# Patient Record
Sex: FEMALE | Race: WHITE | Employment: FULL TIME | URBAN - METROPOLITAN AREA
[De-identification: names, ages, dates, MRNs, and addresses within clinical notes are randomized per-mention and may not be internally consistent; named-entity substitution may affect disease eponyms.]

---

## 2019-03-14 ENCOUNTER — APPOINTMENT (OUTPATIENT)
Dept: GENERAL RADIOLOGY | Age: 49
End: 2019-03-14
Payer: COMMERCIAL

## 2019-03-14 ENCOUNTER — HOSPITAL ENCOUNTER (EMERGENCY)
Age: 49
Discharge: HOME | End: 2019-03-14
Attending: EMERGENCY MEDICINE
Payer: COMMERCIAL

## 2019-03-14 VITALS
SYSTOLIC BLOOD PRESSURE: 115 MMHG | BODY MASS INDEX: 25.61 KG/M2 | WEIGHT: 150 LBS | DIASTOLIC BLOOD PRESSURE: 83 MMHG | HEART RATE: 70 BPM | HEIGHT: 64 IN | TEMPERATURE: 99.1 F | RESPIRATION RATE: 16 BRPM | OXYGEN SATURATION: 97 %

## 2019-03-14 DIAGNOSIS — R11.0 NAUSEA: ICD-10-CM

## 2019-03-14 DIAGNOSIS — J10.1 INFLUENZA A: Primary | ICD-10-CM

## 2019-03-14 LAB
RAPID INFLUENZA  B AGN: NEGATIVE
RAPID INFLUENZA A AGN: POSITIVE

## 2019-03-14 PROCEDURE — 71046 X-RAY EXAM CHEST 2 VIEWS: CPT

## 2019-03-14 PROCEDURE — 99283 EMERGENCY DEPT VISIT LOW MDM: CPT

## 2019-03-14 PROCEDURE — 87804 INFLUENZA ASSAY W/OPTIC: CPT

## 2019-03-14 PROCEDURE — 6370000000 HC RX 637 (ALT 250 FOR IP): Performed by: EMERGENCY MEDICINE

## 2019-03-14 RX ORDER — FLUOXETINE HYDROCHLORIDE 40 MG/1
40 CAPSULE ORAL DAILY
COMMUNITY
End: 2020-03-04

## 2019-03-14 RX ORDER — OSELTAMIVIR PHOSPHATE 75 MG/1
75 CAPSULE ORAL 2 TIMES DAILY
Qty: 10 CAPSULE | Refills: 0 | Status: SHIPPED | OUTPATIENT
Start: 2019-03-14 | End: 2019-03-19

## 2019-03-14 RX ORDER — ONDANSETRON 4 MG/1
4 TABLET, ORALLY DISINTEGRATING ORAL ONCE
Status: DISCONTINUED | OUTPATIENT
Start: 2019-03-14 | End: 2019-03-14

## 2019-03-14 RX ORDER — ONDANSETRON 2 MG/ML
4 INJECTION INTRAMUSCULAR; INTRAVENOUS ONCE
Status: DISCONTINUED | OUTPATIENT
Start: 2019-03-14 | End: 2019-03-14

## 2019-03-14 RX ORDER — ONDANSETRON 4 MG/1
4 TABLET, ORALLY DISINTEGRATING ORAL ONCE
Status: COMPLETED | OUTPATIENT
Start: 2019-03-14 | End: 2019-03-14

## 2019-03-14 RX ORDER — OSELTAMIVIR PHOSPHATE 75 MG/1
75 CAPSULE ORAL ONCE
Status: COMPLETED | OUTPATIENT
Start: 2019-03-14 | End: 2019-03-14

## 2019-03-14 RX ORDER — ONDANSETRON 4 MG/1
4 TABLET, FILM COATED ORAL EVERY 8 HOURS PRN
Qty: 10 TABLET | Refills: 0 | Status: SHIPPED | OUTPATIENT
Start: 2019-03-14 | End: 2020-03-04

## 2019-03-14 RX ADMIN — ONDANSETRON 4 MG: 4 TABLET, ORALLY DISINTEGRATING ORAL at 05:47

## 2019-03-14 RX ADMIN — OSELTAMIVIR PHOSPHATE 75 MG: 75 CAPSULE ORAL at 05:20

## 2020-01-08 ENCOUNTER — OFFICE VISIT (OUTPATIENT)
Dept: ORTHOPEDIC SURGERY | Age: 50
End: 2020-01-08
Payer: COMMERCIAL

## 2020-01-08 VITALS — BODY MASS INDEX: 26.46 KG/M2 | HEIGHT: 64 IN | WEIGHT: 155 LBS

## 2020-01-08 PROCEDURE — G8419 CALC BMI OUT NRM PARAM NOF/U: HCPCS | Performed by: ORTHOPAEDIC SURGERY

## 2020-01-08 PROCEDURE — 99203 OFFICE O/P NEW LOW 30 MIN: CPT | Performed by: ORTHOPAEDIC SURGERY

## 2020-01-08 PROCEDURE — G8484 FLU IMMUNIZE NO ADMIN: HCPCS | Performed by: ORTHOPAEDIC SURGERY

## 2020-01-08 PROCEDURE — G8427 DOCREV CUR MEDS BY ELIG CLIN: HCPCS | Performed by: ORTHOPAEDIC SURGERY

## 2020-01-08 PROCEDURE — L3908 WHO COCK-UP NONMOLDE PRE OTS: HCPCS | Performed by: ORTHOPAEDIC SURGERY

## 2020-01-08 PROCEDURE — 1036F TOBACCO NON-USER: CPT | Performed by: ORTHOPAEDIC SURGERY

## 2020-01-08 NOTE — PROGRESS NOTES
Assessment: Classic symptoms of bilateral carpal tunnel syndrome but she also has ulnar-sided right wrist pain which is been progressive and is consistent with an underlying TFCC injury chronic    Treatment Plan: She has tried using a wrist wraps and these really have not helped her symptoms. I think she is symptomatic enough that she is probably going to require bilateral carpal tunnel releases but we will check EMGs and also do an MRI of her right wrist to see if we have concomitant TFCC pathology and then follow-up 1 more time in the office before considering scheduling surgery. 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 Chief Complaint:  Hand Pain (2 month history of bilateral hand pain and wrist pain, waking up at night, numbness and tingling. She is also having right ulnar sided wrist pain)      History of Present Illness  Radha Ludwig is a 52 y.o. female. Location: bilateral hands Severity: severe pain, particularly at night Duration: 2 onths Modifying factors: rest, splint  Associated symptoms: numbness and tingling    Contributory History  none    Medical History    Current Outpatient Medications on File Prior to Visit   Medication Sig Dispense Refill    Amphetamine-Dextroamphetamine (AMPHETAMINE SALT COMBO PO) Take by mouth      FLUoxetine (PROZAC) 40 MG capsule Take 40 mg by mouth daily      ondansetron (ZOFRAN) 4 MG tablet Take 1 tablet by mouth every 8 hours as needed for Nausea (Patient not taking: Reported on 1/8/2020) 10 tablet 0     No current facility-administered medications on file prior to visit.       Past Medical History:   Diagnosis Date    Influenza A 03/14/2019     No Known Allergies  Social History     Socioeconomic History    Marital status: Single     Spouse name: Not on file    Number of children: Not on file    Years of education: Not on file    Highest education level: Not on file   Occupational History    Not on file   Social Needs    Financial resource strain: Not on file    Food insecurity:     Worry: Not on file     Inability: Not on file    Transportation needs:     Medical: Not on file     Non-medical: Not on file   Tobacco Use    Smoking status: Never Smoker    Smokeless tobacco: Never Used   Substance and Sexual Activity    Alcohol use: Not on file    Drug use: Not on file    Sexual activity: Not on file   Lifestyle    Physical activity:     Days per week: Not on file     Minutes per session: Not on file    Stress: Not on file   Relationships    Social connections:     Talks on phone: Not on file     Gets together: Not on file     Attends Spiritism service: Not on file     Active member of club or organization: Not on file     Attends meetings of clubs or organizations: Not on file     Relationship status: Not on file    Intimate partner violence:     Fear of current or ex partner: Not on file     Emotionally abused: Not on file     Physically abused: Not on file     Forced sexual activity: Not on file   Other Topics Concern    Not on file   Social History Narrative    Not on file     No family history on file. Patient's medications, allergies, past medical, surgical, social and family histories were reviewed and updated as appropriate. Review of Systems  Pertinent items are noted in HPI  Denies fever chills, confusion and bowel and bladder active change  Complete Review of Systems reviewed from patient history form dated 01/08/20 and available in the patients chart under the media tab. Vital Signs  Vitals:    01/08/20 1338   Weight: 155 lb (70.3 kg)   Height: 5' 4\" (1.626 m)     Body mass index is 26.61 kg/m².      Physical Exam  Constitutional: Normal nutritional status  Mental Status: Alert and oriented  Skin: No rashes or erythema  Lymphatic: No lymphadenopathy    Right Hand Examination:  Inspection: No objective swelling  Finger Range of Motion: Full  Wrist Range of Motion: Normal  Vascular Exam: Normal capillary refill  Neurologic Exam: Tinel sign is negative in the supraclavicular fossa, mid brachium, cubital tunnel, pronator teres regions. It is positive in the carpal tunnel and Phalen's test is positive  Intrinsic Muscle Strength: Normal  Extrinsic Muscle Strength: Normal  Special Tests: Negative pisiform triquetral shear test.  She is tender directly over the ulnar insertion of the TFCC and axial loading with ulnar deviation and compression of the wrist does reproduce her symptoms. She is mildly tender over her flexor carpi ulnaris nontender over extensor carpi ulnaris    Left Hand Examination:  Inspection: No atrophy  Finger Range of Motion: Full  Wrist Range of Motion: Normal normal capillary refill  Vascular Exam: Normal capillary refill  Neurologic Exam: Tinel sign is negative in the supraclavicular fossa, mid brachium, cubital tunnel, pronator teres regions. It is positive in the carpal tunnel and Phalen's test is positive  Intrinsic Muscle Strength: Normal  Extrinsic Muscle Strength: Normal  Special Tests: Negative Martins's    Neck Exam: No real paraspinous tenderness full range of motion    Additional Comments:     Additional Examinations:  X-Ray Findings: PA lateral and oblique x-rays of the left hand are normal.    PA lateral and oblique x-rays of the right hand and wrist are normal  Additional Diagnostic Test Findings:    Office Procedures: This dictation was performed with a verbal recognition program. It is possible that there are still dictated errors within this office note. All efforts were made to ensure that this office note is accurate.     Orders Placed This Encounter   Procedures    XR HAND RIGHT (MIN 3 VIEWS)     Standing Status:   Future     Number of Occurrences:   1     Standing Expiration Date:   2/8/2020    XR HAND LEFT (MIN 3 VIEWS)     Standing Status:   Future     Number of

## 2020-01-09 ENCOUNTER — TELEPHONE (OUTPATIENT)
Dept: ORTHOPEDIC SURGERY | Age: 50
End: 2020-01-09

## 2020-01-09 NOTE — TELEPHONE ENCOUNTER
Left voicemail for patient that their MRI/CT has been authorized and that they can call and schedule scan at their convenience. Also told them that they can call and schedule a f/u with Dr. Gabbi Nelson once they have MRI/CT scheduled, leaving at least 2-3 days for our office to receive their results. There are no preventive care reminders to display for this patient.    Patient is up to date, no discussion needed.

## 2020-01-21 RX ORDER — IBUPROFEN 800 MG/1
800 TABLET ORAL EVERY 8 HOURS PRN
Qty: 30 TABLET | Refills: 1 | Status: ON HOLD | OUTPATIENT
Start: 2020-01-21 | End: 2020-03-11 | Stop reason: HOSPADM

## 2020-01-29 ENCOUNTER — OFFICE VISIT (OUTPATIENT)
Dept: ORTHOPEDIC SURGERY | Age: 50
End: 2020-01-29
Payer: COMMERCIAL

## 2020-01-29 PROCEDURE — 95910 NRV CNDJ TEST 7-8 STUDIES: CPT | Performed by: PHYSICAL MEDICINE & REHABILITATION

## 2020-01-29 PROCEDURE — 95886 MUSC TEST DONE W/N TEST COMP: CPT | Performed by: PHYSICAL MEDICINE & REHABILITATION

## 2020-01-29 NOTE — PROGRESS NOTES
Ref.      Ref.   ?49       EMG Summary Table     Spontaneous MUAP Recruitment   Muscle Nerve Roots IA Fib PSW Fasc H.F. Amp Dur. PPP Pattern   L. Abductor pollicis brevis Median F3-Q0 N None None None None N N N N   R. Abductor pollicis brevis Median F3-U4 N None None None None N N N N   R. Deltoid Axillary C5-C6 N None None None None N N N N   R. Triceps brachii Radial C6-C8 N None None None None N N N N   R. Biceps brachii Musculocutaneous C5-C6 N None None None None N N N N   R. Pronator teres Median C6-C7 N None None None None N N N N   R. Extensor digitorum communis Radial C7-C8 N None None None None N N N N   R. First dorsal interosseous Ulnar C8-T1 N None None None None N N N N         Summary: Bilateral median SNAP and CMAP latencies are slowed with low left sensory amplitudes. The remainder of the NCS and monopolar exam are normal.        Conclusion:  Abnormal examination. There is electrodiagnostic evidence of:    1. Moderate to severe left carpal tunnel syndrome  2. Moderate right carpal tunnel syndrome  3.  No evidence of any other left or right upper extremity mononeuropathy, plexopathy, or radiculopathy            Akash Ramirez MD    Board Certified Physical Medicine and Rehabilitation

## 2020-01-30 ENCOUNTER — TELEPHONE (OUTPATIENT)
Dept: ORTHOPEDIC SURGERY | Age: 50
End: 2020-01-30

## 2020-01-30 NOTE — TELEPHONE ENCOUNTER
Patient left message stating that the Ibuprofen we had prescribed for pain was not touching her pain, and she is unable to sleep. She states she has not slept well in weeks, and is very frustrated due to the fact that we can't give her something to help her sleep. I left a message stating that we could see her sometime next week before Dr. Dulce Maria Agarwal goes on vacation since she has had her EMG testing. I'm not sure if she has had her MRI yet. She will call central scheduling to make an appointment.

## 2020-02-03 ENCOUNTER — TELEPHONE (OUTPATIENT)
Dept: ORTHOPEDIC SURGERY | Age: 50
End: 2020-02-03

## 2020-02-04 ENCOUNTER — OFFICE VISIT (OUTPATIENT)
Dept: ORTHOPEDIC SURGERY | Age: 50
End: 2020-02-04
Payer: COMMERCIAL

## 2020-02-04 VITALS — HEIGHT: 64 IN | RESPIRATION RATE: 16 BRPM | WEIGHT: 154.98 LBS | BODY MASS INDEX: 26.46 KG/M2

## 2020-02-04 PROBLEM — M79.642 BILATERAL HAND PAIN: Status: ACTIVE | Noted: 2020-02-04

## 2020-02-04 PROBLEM — G56.03 CARPAL TUNNEL SYNDROME, BILATERAL: Status: ACTIVE | Noted: 2020-02-04

## 2020-02-04 PROBLEM — R20.0 NUMBNESS IN BOTH HANDS: Status: ACTIVE | Noted: 2020-02-04

## 2020-02-04 PROBLEM — M79.641 BILATERAL HAND PAIN: Status: ACTIVE | Noted: 2020-02-04

## 2020-02-04 PROCEDURE — G8419 CALC BMI OUT NRM PARAM NOF/U: HCPCS | Performed by: ORTHOPAEDIC SURGERY

## 2020-02-04 PROCEDURE — 20526 THER INJECTION CARP TUNNEL: CPT | Performed by: ORTHOPAEDIC SURGERY

## 2020-02-04 PROCEDURE — G8427 DOCREV CUR MEDS BY ELIG CLIN: HCPCS | Performed by: ORTHOPAEDIC SURGERY

## 2020-02-04 PROCEDURE — 1036F TOBACCO NON-USER: CPT | Performed by: ORTHOPAEDIC SURGERY

## 2020-02-04 PROCEDURE — 99214 OFFICE O/P EST MOD 30 MIN: CPT | Performed by: ORTHOPAEDIC SURGERY

## 2020-02-04 PROCEDURE — G8484 FLU IMMUNIZE NO ADMIN: HCPCS | Performed by: ORTHOPAEDIC SURGERY

## 2020-02-04 RX ORDER — BETAMETHASONE SODIUM PHOSPHATE AND BETAMETHASONE ACETATE 3; 3 MG/ML; MG/ML
12 INJECTION, SUSPENSION INTRA-ARTICULAR; INTRALESIONAL; INTRAMUSCULAR; SOFT TISSUE ONCE
Status: COMPLETED | OUTPATIENT
Start: 2020-02-04 | End: 2020-02-04

## 2020-02-04 RX ADMIN — BETAMETHASONE SODIUM PHOSPHATE AND BETAMETHASONE ACETATE 12 MG: 3; 3 INJECTION, SUSPENSION INTRA-ARTICULAR; INTRALESIONAL; INTRAMUSCULAR; SOFT TISSUE at 08:50

## 2020-02-04 NOTE — PROGRESS NOTES
HISTORY OF PRESENT ILLNESS: The patient is a 51-year-old female who presents today for a new hand surgery specialty evaluation regarding both hands and wrists. She recently saw my partner and there was a strong suspicion for bilateral carpal tunnel syndrome but also some concerns about possible involvement of the TFC injury on the right wrist.  She had been ordered for electrodiagnostic testing and an MRI of the wrist.  She really felt that the hand symptoms were the main issue and so she deferred on the MRI. Subsequently, she became very uncomfortable in both hands and became dissatisfied with recommendations for simple medication solutions. She did obtain the EMG test which demonstrated moderately severe bilateral carpal tunnel syndrome, without any endorgan muscle changes. She has been having significant discomfort at nighttime despite wrist bracing. PAST MEDICAL HISTORY: Patient's medications, allergies, past medical, surgical, social and family histories were reviewed and updated as appropriate. ROS: Pertinent items are noted in HPI. Review of systems reviewed from patient history form dated on 1/8/2020 and available in the patient's chart under the media tab. Denies fever, chills, confusion, bowel/bladder active change. PHYSICAL EXAMINATION: Examination reveals a pleasant individual in no acute distress. There appears to be satisfactory pain-free range of motion, strength, and stability of the cervical spine, shoulders, and elbows. Skin is intact without lymphadenopathy, discoloration, or abnormal temperature. There is intact, symmetric circulation in both upper extremities. Wrist, hand, and digital range of motion is satisfactory bilaterally. Provocative testing for bilateral carpal tunnel syndrome suggests reproduction of symptoms with direct compression, Phalen's, and Tinel's. There is no sign of circulatory dysfunction or atrophy.     Along the wrist I really am not seeing any dramatic

## 2020-02-25 ENCOUNTER — TELEPHONE (OUTPATIENT)
Dept: ORTHOPEDIC SURGERY | Age: 50
End: 2020-02-25

## 2020-02-25 NOTE — TELEPHONE ENCOUNTER
2/28/2020 - FAXED COMPLETED FMLA TO DARY[employer] M[employer]O WITH [employer] @ [phone]    COMPLETED FMLA FOR [employer]. WILL RELEASE ONCE SIGNED AUTH IS RECEIVED.

## 2020-03-03 NOTE — PROGRESS NOTES
Katheryn Pal    Age 48 y.o.    female    1970    MRN [id number]    3/11/2020  Arrival Time_____________  OR Time____________30 [address]     Procedure(s):  BILATERAL CARPAL TUNNEL RELEASES                      Monitor Anesthesia Care    Surgeon(s):  Livia Comes, MD       Phone [phone] (home)     [address] [place]  Cell Work  _________________________________________________________________  _________________________________________________________________  _________________________________________________________________  _________________________________________________________________  _________________________________________________________________      PCP _____________________________ Phone_________________       H&P__________________Bringing    Chart            Epic  DOS     Called_______  EKG__________________Bringing    Chart            Epic  DOS     Called_______  LAB__________________ Bringing    Chart            Epic  DOS     Called_______  Cardiac Clearance_______Bringing    Chart            Epic      DOS       Called_______    Cardiologist________________________ Phone___________________________      ? Jehovah's witness concerns / Waiver on Chart            [employer]_____________  ? Pre-op Instructions Given [place]          ______________________________  ? Directions to Surgery Center                          ______________________________  ? Transportation Home_______________      _______________________________  ?  Crutches/Walker__________________        _______________________________      ________Pre-op Orders   _______Transcribed    _______Wt.  ________Pharmacy          _______SCD  ______VTE     ______B[facility] [employer]ocker  ________Consent             ________TED [place]

## 2020-03-04 RX ORDER — VITAMIN B COMPLEX
1 CAPSULE ORAL DAILY
COMMUNITY

## 2020-03-04 NOTE — PROGRESS NOTES
Obstructive Sleep Apnea (TAHMINA) Screening     Patient:  Nathalia Faye    YOB: 1970      Medical Record #:  [id number]                     Date:  3/4/2020     1. Are you a loud and/or regular snorer? []  Yes       [x] No    2. Have you been observed to gasp or stop breathing during sleep? []  Yes       [x] No    3. Do you feel tired or groggy upon awakening or do you awaken with a headache?           []  Yes       [] No    4. Are you often tired or fatigued during the wake time hours? []  Yes       [] No    5. Do you fall asleep sitting, reading, watching TV or driving? []  Yes       [] No    6. Do you often have problems with memory or concentration? []  Yes       [] No    **If patient's score is ? 3 they are considered high risk for TAHMINA. An Anesthesia provider will evaluate the patient and develop a plan of care the day of surgery. Note:  If the patient's BMI is more than 35 kg m¯² , has neck circumference > 40 cm, and/or high blood pressure the risk is greater (© American Sleep Apnea Association, 2006).

## 2020-03-10 ENCOUNTER — ANESTHESIA EVENT (OUTPATIENT)
Dept: OPERATING ROOM | Age: 50
End: 2020-03-10
Payer: COMMERCIAL

## 2020-03-10 NOTE — H&P
I have reviewed the H&P and examined the patient and find no relevant changes. I have reviewed with the patient and/or family the risks, benefits, and alternatives to the procedure(s). Past Medical History:   Diagnosis Date    ADD (attention deficit disorder)     Anxiety and depression     Factor 5 Leiden mutation, heterozygous ([address]     Influenza A 03/14/2019     Past Surgical History:   Procedure Laterality Date    TUBAL LIGATION      TUBAL LIGATION      reversal     No current facility-administered medications on file prior to encounter.       Current Outpatient Medications on File Prior to Encounter   Medication Sig Dispense Refill    Multiple Vitamins-Minerals (MULTIVITAMIN PO) Take by mouth daily      MAGNESIUM PO Take by mouth daily With calcium and zinc      b complex vitamins capsule Take 1 capsule by mouth daily      ibuprofen (ADVIL;MOTRIN) 800 MG tablet Take 1 tablet by mouth every 8 hours as needed for Pain 30 tablet [address]

## 2020-03-11 ENCOUNTER — ANESTHESIA (OUTPATIENT)
Dept: OPERATING ROOM | Age: 50
End: 2020-03-11
Payer: COMMERCIAL

## 2020-03-11 ENCOUNTER — HOSPITAL ENCOUNTER (OUTPATIENT)
Age: 50
Setting detail: OUTPATIENT SURGERY
Discharge: HOME | End: 2020-03-11
Attending: ORTHOPAEDIC SURGERY | Admitting: ORTHOPAEDIC SURGERY
Payer: COMMERCIAL

## 2020-03-11 VITALS
DIASTOLIC BLOOD PRESSURE: 82 MMHG | RESPIRATION RATE: 15 BRPM | SYSTOLIC BLOOD PRESSURE: 142 MMHG | HEIGHT: 64 IN | BODY MASS INDEX: 27.31 KG/M2 | TEMPERATURE: 97.7 F | WEIGHT: 160 LBS | HEART RATE: 65 BPM | OXYGEN SATURATION: 95 %

## 2020-03-11 VITALS
SYSTOLIC BLOOD PRESSURE: 93 MMHG | RESPIRATION RATE: 4 BRPM | DIASTOLIC BLOOD PRESSURE: 38 MMHG | OXYGEN SATURATION: 99 %

## 2020-03-11 LAB — PREGNANCY, URINE: NEGATIVE

## 2020-03-11 PROCEDURE — 2500000003 HC RX 250 WO HCPCS: Performed by: NURSE ANESTHETIST, CERTIFIED REGISTERED

## 2020-03-11 PROCEDURE — 2580000003 HC RX 258: Performed by: ANESTHESIOLOGY

## 2020-03-11 PROCEDURE — 3600000012 HC SURGERY LEVEL 2 ADDTL 15MIN: Performed by: ORTHOPAEDIC SURGERY

## 2020-03-11 PROCEDURE — 7100000010 HC PHASE II RECOVERY - FIRST 15 MIN: Performed by: ORTHOPAEDIC SURGERY

## 2020-03-11 PROCEDURE — 2709999900 HC NON-CHARGEABLE SUPPLY: Performed by: ORTHOPAEDIC SURGERY

## 2020-03-11 PROCEDURE — 7100000011 HC PHASE II RECOVERY - ADDTL 15 MIN: Performed by: ORTHOPAEDIC SURGERY

## 2020-03-11 PROCEDURE — 3600000002 HC SURGERY LEVEL 2 BASE: Performed by: ORTHOPAEDIC SURGERY

## 2020-03-11 PROCEDURE — 6360000002 HC RX W HCPCS: Performed by: NURSE ANESTHETIST, CERTIFIED REGISTERED

## 2020-03-11 PROCEDURE — 2500000003 HC RX 250 WO HCPCS: Performed by: ORTHOPAEDIC SURGERY

## 2020-03-11 PROCEDURE — 3700000000 HC ANESTHESIA ATTENDED CARE: Performed by: ORTHOPAEDIC SURGERY

## 2020-03-11 PROCEDURE — 84703 CHORIONIC GONADOTROPIN ASSAY: CPT

## 2020-03-11 PROCEDURE — 3700000001 HC ADD 15 MINUTES (ANESTHESIA): Performed by: ORTHOPAEDIC SURGERY

## 2020-03-11 RX ORDER — MORPHINE SULFATE 2 MG/ML
1 INJECTION, SOLUTION INTRAMUSCULAR; INTRAVENOUS EVERY 5 MIN PRN
Status: DISCONTINUED | OUTPATIENT
Start: 2020-03-11 | End: 2020-03-11 | Stop reason: HOSPADM

## 2020-03-11 RX ORDER — LIDOCAINE HYDROCHLORIDE 20 MG/ML
INJECTION, SOLUTION INFILTRATION; PERINEURAL PRN
Status: DISCONTINUED | OUTPATIENT
Start: 2020-03-11 | End: 2020-03-11 | Stop reason: SDUPTHER

## 2020-03-11 RX ORDER — OXYCODONE HYDROCHLORIDE AND ACETAMINOPHEN 5; 325 MG/1; MG/1
2 TABLET ORAL PRN
Status: DISCONTINUED | OUTPATIENT
Start: 2020-03-11 | End: 2020-03-11 | Stop reason: HOSPADM

## 2020-03-11 RX ORDER — IBUPROFEN 800 MG/1
800 TABLET ORAL EVERY 8 HOURS PRN
Qty: 60 TABLET | Refills: 1 | Status: SHIPPED | OUTPATIENT
Start: 2020-03-11

## 2020-03-11 RX ORDER — MORPHINE SULFATE 2 MG/ML
2 INJECTION, SOLUTION INTRAMUSCULAR; INTRAVENOUS EVERY 5 MIN PRN
Status: DISCONTINUED | OUTPATIENT
Start: 2020-03-11 | End: 2020-03-11 | Stop reason: HOSPADM

## 2020-03-11 RX ORDER — SODIUM CHLORIDE 0.9 % (FLUSH) 0.9 %
10 SYRINGE (ML) INJECTION PRN
Status: DISCONTINUED | OUTPATIENT
Start: 2020-03-11 | End: 2020-03-11 | Stop reason: HOSPADM

## 2020-03-11 RX ORDER — SODIUM CHLORIDE 0.9 % (FLUSH) 0.9 %
10 SYRINGE (ML) INJECTION EVERY 12 HOURS SCHEDULED
Status: DISCONTINUED | OUTPATIENT
Start: 2020-03-11 | End: 2020-03-11 | Stop reason: HOSPADM

## 2020-03-11 RX ORDER — ONDANSETRON 2 MG/ML
4 INJECTION INTRAMUSCULAR; INTRAVENOUS PRN
Status: DISCONTINUED | OUTPATIENT
Start: 2020-03-11 | End: 2020-03-11 | Stop reason: HOSPADM

## 2020-03-11 RX ORDER — OXYCODONE HYDROCHLORIDE AND ACETAMINOPHEN 5; 325 MG/1; MG/1
1 TABLET ORAL PRN
Status: DISCONTINUED | OUTPATIENT
Start: 2020-03-11 | End: 2020-03-11 | Stop reason: HOSPADM

## 2020-03-11 RX ORDER — GLYCOPYRROLATE 1 MG/5 ML
SYRINGE (ML) INTRAVENOUS PRN
Status: DISCONTINUED | OUTPATIENT
Start: 2020-03-11 | End: 2020-03-11 | Stop reason: SDUPTHER

## 2020-03-11 RX ORDER — SODIUM CHLORIDE, SODIUM LACTATE, POTASSIUM CHLORIDE, CALCIUM CHLORIDE 600; 310; 30; 20 MG/100ML; MG/100ML; MG/100ML; MG/100ML
INJECTION, SOLUTION INTRAVENOUS CONTINUOUS
Status: DISCONTINUED | OUTPATIENT
Start: 2020-03-11 | End: 2020-03-11 | Stop reason: HOSPADM

## 2020-03-11 RX ORDER — MEPERIDINE HYDROCHLORIDE 50 MG/ML
12.5 INJECTION INTRAMUSCULAR; INTRAVENOUS; SUBCUTANEOUS EVERY 5 MIN PRN
Status: DISCONTINUED | OUTPATIENT
Start: 2020-03-11 | End: 2020-03-11 | Stop reason: HOSPADM

## 2020-03-11 RX ORDER — HYDRALAZINE HYDROCHLORIDE 20 MG/ML
5 INJECTION INTRAMUSCULAR; INTRAVENOUS EVERY 10 MIN PRN
Status: DISCONTINUED | OUTPATIENT
Start: 2020-03-11 | End: 2020-03-11 | Stop reason: HOSPADM

## 2020-03-11 RX ORDER — LABETALOL HYDROCHLORIDE 5 MG/ML
5 INJECTION, SOLUTION INTRAVENOUS EVERY 10 MIN PRN
Status: DISCONTINUED | OUTPATIENT
Start: 2020-03-11 | End: 2020-03-11 | Stop reason: HOSPADM

## 2020-03-11 RX ORDER — PROPOFOL 10 MG/ML
INJECTION, EMULSION INTRAVENOUS PRN
Status: DISCONTINUED | OUTPATIENT
Start: 2020-03-11 | End: 2020-03-11 | Stop reason: SDUPTHER

## 2020-03-11 RX ORDER — PROMETHAZINE HYDROCHLORIDE 25 MG/ML
6.25 INJECTION, SOLUTION INTRAMUSCULAR; INTRAVENOUS
Status: DISCONTINUED | OUTPATIENT
Start: 2020-03-11 | End: 2020-03-11 | Stop reason: HOSPADM

## 2020-03-11 RX ORDER — DIPHENHYDRAMINE HYDROCHLORIDE 50 MG/ML
12.5 INJECTION INTRAMUSCULAR; INTRAVENOUS
Status: DISCONTINUED | OUTPATIENT
Start: 2020-03-11 | End: 2020-03-11 | Stop reason: HOSPADM

## 2020-03-11 RX ORDER — MIDAZOLAM HYDROCHLORIDE 1 MG/ML
INJECTION INTRAMUSCULAR; INTRAVENOUS PRN
Status: DISCONTINUED | OUTPATIENT
Start: 2020-03-11 | End: 2020-03-11 | Stop reason: SDUPTHER

## 2020-03-11 RX ORDER — HYDROCODONE BITARTRATE AND ACETAMINOPHEN 5; 325 MG/1; MG/1
1 TABLET ORAL EVERY 6 HOURS PRN
Qty: 20 TABLET | Refills: 0 | Status: SHIPPED | OUTPATIENT
Start: 2020-03-11 | End: 2020-03-18

## 2020-03-11 RX ORDER — LIDOCAINE HYDROCHLORIDE 10 MG/ML
1 INJECTION, SOLUTION EPIDURAL; INFILTRATION; INTRACAUDAL; PERINEURAL
Status: DISCONTINUED | OUTPATIENT
Start: 2020-03-11 | End: 2020-03-11 | Stop reason: HOSPADM

## 2020-03-11 RX ADMIN — PROPOFOL 20 MG: 10 INJECTION, EMULSION INTRAVENOUS at 10:12

## 2020-03-11 RX ADMIN — PROPOFOL 20 MG: 10 INJECTION, EMULSION INTRAVENOUS at 10:05

## 2020-03-11 RX ADMIN — SODIUM CHLORIDE, POTASSIUM CHLORIDE, SODIUM LACTATE AND CALCIUM CHLORIDE: 600; 310; 30; 20 INJECTION, SOLUTION INTRAVENOUS at 09:55

## 2020-03-11 RX ADMIN — LIDOCAINE HYDROCHLORIDE 40 MG: 20 INJECTION, SOLUTION INFILTRATION; PERINEURAL at 09:58

## 2020-03-11 RX ADMIN — MIDAZOLAM HYDROCHLORIDE 2 MG: 2 INJECTION, SOLUTION INTRAMUSCULAR; INTRAVENOUS at 09:53

## 2020-03-11 RX ADMIN — Medication 0.1 MG: at 09:54

## 2020-03-11 RX ADMIN — PROPOFOL 250 MG: 10 INJECTION, EMULSION INTRAVENOUS at 09:58

## 2020-03-11 RX ADMIN — SODIUM CHLORIDE, POTASSIUM CHLORIDE, SODIUM LACTATE AND CALCIUM CHLORIDE: 600; 310; 30; 20 INJECTION, SOLUTION INTRAVENOUS at 08:17

## 2020-03-11 ASSESSMENT — PAIN SCALES - GENERAL
PAINLEVEL_OUTOF10: 0

## 2020-03-11 ASSESSMENT — PULMONARY FUNCTION TESTS
PIF_VALUE: 0
PIF_VALUE: 1
PIF_VALUE: 2
PIF_VALUE: 0
PIF_VALUE: 1
PIF_VALUE: 1
PIF_VALUE: 9
PIF_VALUE: 1
PIF_VALUE: 0
PIF_VALUE: 1

## 2020-03-11 ASSESSMENT — PAIN - FUNCTIONAL ASSESSMENT: PAIN_FUNCTIONAL_ASSESSMENT: 0-10

## 2020-03-11 NOTE — ANESTHESIA POSTPROCEDURE EVALUATION
Department of Anesthesiology  Postprocedure Note    Patient: Roberto Mauro  MRN: [id number]  YOB: 1970  Date of evaluation: 3/11/2020  Time:  12:40 PM     Procedure Summary     Date:  03/11/20 Room / Location:  [address] [place] [address]    Anesthesia Start:  4722 Anesthesia Stop:  1024    Procedure:  BILATERAL CARPAL TUNNEL RELEASES (Bilateral Hand) Diagnosis:       Bilateral carpal tunnel syndrome      (Bilateral carpal tunnel syndrome [G56.03])    Surgeon:  Yuko Peguero MD Responsible Provider:  Richard Levin MD    Anesthesia Type:  MAC ASA Status:  2          Anesthesia Type: MAC    Gurpreet Phase I: Gurpreet Score: 10    Gurpreet Phase II: Gurpreet Score: 10    Last vitals: Reviewed and per EMR flowsheets.        Anesthesia Post Evaluation    Patient location during evaluation: PACU  Patient participation: complete - patient participated  Level of consciousness: awake and alert  Pain score: 0  Airway patency: patent  Nausea & Vomiting: no nausea and no vomiting  Complications: no  Cardiovascular status: blood pressure returned to baseline  Respiratory status: acceptable  Hydration status: stable

## 2020-03-11 NOTE — ANESTHESIA PRE PROCEDURE
Department of Anesthesiology  Preprocedure Note       Name:  Katheryn Horner   Age:  48 y.o.  :  1970                                          MRN:  [id number]         Date:  3/11/2020      Surgeon: Holly Sahni):  Livia Perdomo MD    Procedure: BILATERAL CARPAL TUNNEL RELEASES (Bilateral Hand)    Medications prior to admission:   Prior to Admission medications    Medication Sig Start Date End Date Taking?  Authorizing Provider   Multiple Vitamins-Minerals (MULTIVITAMIN PO) Take by mouth daily   Yes Historical Provider, MD   MAGNESIUM PO Take by mouth daily With calcium and zinc   Yes Historical Provider, MD   b complex vitamins capsule Take 1 capsule by mouth daily   Yes Historical Provider, MD   ibuprofen (ADVIL;MOTRIN) 800 MG tablet Take 1 tablet by mouth every 8 hours as needed for Pain 20   Alfredo Vasquez MD       Current medications:    Current Facility-Administered Medications   Medication Dose Route Frequency Provider Last Rate Last Dose    lactated ringers infusion   Intravenous Continuous Bhavesh Vitale  mL/hr at 20 0817      sodium chloride flush 0.9 % injection 10 mL  10 mL Intravenous 2 times per day Bhavesh Vitale MD        sodium chloride flush 0.9 % injection 10 mL  10 mL Intravenous PRN Bhavesh Vitale MD        lidocaine PF 1 % injection 1 mL  1 mL Intradermal Once PRN Bhavesh Vitale MD           Allergies:  No Known Allergies    Problem List:    Patient Active Problem List   Diagnosis Code    Bilateral hand pain M79.641, M79.642    Numbness in both hands R20.0    Carpal tunnel syndrome, bilateral G56.03       Past Medical History:        Diagnosis Date    ADD (attention deficit disorder)     Anxiety and depression     [facility]     Influenza A 2019       Past Surgical History:        Procedure Laterality Date    TUBAL LIGATION      TUBAL LIGATION      reversal       Social History:    Social History     Tobacco Use    Smoking status: Never Smoker    Smokeless tobacco: Never Used   Substance Use Topics    Alcohol use: Not on file     Comment: socially - rare                                Counseling given: Not Answered      Vital Signs (Current):   Vitals:    03/04/20 1519 03/11/20 0800   BP:  108/63   Pulse:  73   Resp:  16   Temp:  98 °F (36.7 °C)   TempSrc:  Temporal   SpO2:  97%   Weight: 160 lb (72.6 kg) 160 lb (72.6 kg)   Height: 5' 4\" (1.626 m) 5' 4\" (1.626 m)                                              BP Readings from Last 3 Encounters:   03/11/20 108/63   03/14/19 115/83       NPO Status: Time of last liquid consumption: 1930                        Time of last solid consumption: 1800                        Date of last liquid consumption: 03/10/20                        Date of last solid food consumption: 03/10/20    BMI:   Wt Readings from Last 3 Encounters:   03/11/20 160 lb (72.6 kg)   02/04/20 154 lb 15.7 oz (70.3 kg)   01/08/20 155 lb (70.3 kg)     Body mass index is 27.46 kg/m². CBC: No results found for: WBC, RBC, HGB, HCT, MCV, RDW, PLT    CMP: No results found for: NA, K, CL, CO2, BUN, CREATININE, GFRAA, AGRATIO, LABGLOM, GLUCOSE, PROT, CALCIUM, BILITOT, ALKPHOS, AST, ALT    POC Tests: No results for input(s): POCGLU, POCNA, POCK, POCCL, POCBUN, POCHEMO, POCHCT in the last 72 hours.     Coags: No results found for: PROTIME, INR, APTT    HCG (If Applicable): No results found for: PREGTESTUR, PREGSERUM, HCG, HCGQUANT     ABGs: No results found for: PHART, PO2ART, XOX2NFG, OSD9PKB, BEART, [id number]     Type & Screen (If Applicable):  No results found for: LABABO, [address]    Anesthesia Evaluation  Patient summary reviewed and Nursing notes reviewed  Airway: Mallampati: II  TM distance: >3 FB   Neck ROM: full  Mouth opening: > = 3 FB Dental: normal exam         Pulmonary:Negative Pulmonary ROS and normal exam  breath sounds clear to auscultation Cardiovascular:Negative CV ROS            Rhythm: regular  Rate: normal                    Neuro/Psych:   (+) depression/anxiety             GI/Hepatic/Renal: Neg GI/Hepatic/Renal ROS            Endo/Other:                      ROS comment: Factor V Leiden Abdominal:           Vascular: negative vascular ROS. Anesthesia Plan      MAC     ASA 2       Induction: intravenous. Anesthetic plan and risks discussed with patient. Plan discussed with CRNA.                   Sita Brown MD   3/11/2020

## 2020-03-20 PROCEDURE — L3908 WHO COCK-UP NONMOLDE PRE OTS: HCPCS | Performed by: ORTHOPAEDIC SURGERY

## 2020-03-30 ENCOUNTER — TELEPHONE (OUTPATIENT)
Dept: ORTHOPEDIC SURGERY | Age: 50
End: 2020-03-30

## 2020-04-02 ENCOUNTER — VIRTUAL VISIT (OUTPATIENT)
Dept: ORTHOPEDIC SURGERY | Age: 50
End: 2020-04-02

## 2020-04-02 PROCEDURE — 99024 POSTOP FOLLOW-UP VISIT: CPT | Performed by: ORTHOPAEDIC SURGERY

## 2020-04-20 ENCOUNTER — HOSPITAL ENCOUNTER (OUTPATIENT)
Dept: OCCUPATIONAL THERAPY | Age: 50
Setting detail: THERAPIES SERIES
Discharge: HOME | End: 2020-04-20
Payer: COMMERCIAL

## 2020-04-20 ENCOUNTER — OFFICE VISIT (OUTPATIENT)
Dept: ORTHOPEDIC SURGERY | Age: 50
End: 2020-04-20

## 2020-04-20 PROCEDURE — 97165 OT EVAL LOW COMPLEX 30 MIN: CPT | Performed by: OCCUPATIONAL THERAPIST

## 2020-04-20 PROCEDURE — 97140 MANUAL THERAPY 1/> REGIONS: CPT | Performed by: OCCUPATIONAL THERAPIST

## 2020-04-20 PROCEDURE — 99024 POSTOP FOLLOW-UP VISIT: CPT | Performed by: ORTHOPAEDIC SURGERY

## 2020-04-20 PROCEDURE — 97110 THERAPEUTIC EXERCISES: CPT | Performed by: OCCUPATIONAL THERAPIST

## 2020-04-20 NOTE — FLOWSHEET NOTE
right radial side of incision, and ulnar side of incision   Easing factors: nothing particular  Provocative factors: pressure, leaning onto hand, driving      SUBJECTIVE: Patient reported deficits/history of current problem: First saw MD for hand pain that started in approx November 2019. Had diagnostic testing including bilateral EMG and subsequent bilateral carpal tunnel release on 3/31/2020. Today had an in person follow up with her surgeon, complaining of continuing hand pain, left greater than right and he sent her over for hand therapy evaluation and follow up visits in person or virtual therapy as needed. Today complains of morning stiffness, pain from ulnar side of hand up into olecranon area which she notices with driving, sensory symptoms seem to have resolved. Visit # Insurance Allowable Auth Required   1 ? []  Yes []  No    From 4/20/20  to 4/20/2020    OBJECTIVE:   Date:  Hand Dominance:     []? Right    [x]? Left 4/20/2020      Objective Measures:     PAIN 4/10   Quick DASH 50%   Digits tip to DPFC in cm WNL   Thumb ROM End range stiffness Left vs right   Wrist ROM Ext/Flex R:70/80  L:70/70   Edema in cm circumf. Wrist R:16.1  L:16.6    strength in lbs R:22  L:20   Pinch Strengthin lbs: lat  R:10  L:10   Pinch Strength in lbs:  3 point R:7  L:7      MMT:  NT   Observations:  (including splints, bandages, incisions, scars): Left palm seems to be more edematous than right   Sensation [x]? No reported deficits  []? Intact to light touch       []? Aurora Pancho test completed, findings as noted:  []?  Other:   Palpation Tender area right hand radial side of incision, almost feels like a cyst, is superficial      MODALITIES: 4/20/20      Fluidotherapy (70082) -     Estim (95569/32139)      Paraffin (00591)      US (24299)      Iontophoresis (42432)      Hot Pack      Cold Pack            INTERVENTIONS:      Therapeutic Exercise (22140) HEP-copy in chart                             Therapeutic Activity (32164)                  Manual Therapy (85280) bilat thenar, scar mobs, use of biofreeze     (IASTM, Dry Needling, manual mobilization)            Neuromuscular Reeducation (95854)                  ADL Training (31967)                  HEP Training/Review See sheet(s)                 Splinting      Lcode:      Orthotic Mgmt, Subsequent Enc (65781)      Orthotic Mgmt & Training (93436)            Other: bilat edema gloves small with silicone gel pads to use at night       Therapeutic Exercise & NMR:  [x] (31958) Provided verbal/tactile cueing for activities related to strengthening, flexibility, endurance, ROM  for improvements in scapular, scapulothoracic and UE control with self care, reaching, carrying, lifting, house/yardwork, driving/computer work.    [] (07673) Provided verbal/tactile cueing for activities related to improving balance, coordination, kinesthetic sense, posture, motor skill, proprioception  to assist with  scapular, scapulothoracic and UE control with self care, reaching, carrying, lifting, house/yardwork, driving/computer work.     Therapeutic Activities & NMR:    [] (71548 or 59707) Provided verbal/tactile cueing for activities related to improving balance, coordination, kinesthetic sense, posture, motor skill, proprioception and motor activation to allow for proper function of scapular, scapulothoracic and UE control with self care, carrying, lifting, driving/computer work    Home Exercise Program:    [x] (94611) Reviewed/Progressed HEP activities related to strengthening, flexibility, endurance, ROM of scapular, scapulothoracic and UE control with self care, reaching, carrying, lifting, house/yardwork, driving/computer work  [] (30533) Reviewed/Progressed HEP activities related to improving balance, coordination, kinesthetic sense, posture, motor skill, proprioception of scapular, scapulothoracic and UE control with self care, reaching, carrying, lifting, house/yardwork, driving/computer work      Manual Treatments:  PROM / STM / Oscillations-Mobs:  G-I, II, III, IV (PA's, Inf., Post.)  [x] (64409) Provided manual therapy to mobilize soft tissue/joints of cervical/CT, scapular GHJ and UE for the purpose of modulating pain, promoting relaxation,  increasing ROM, reducing/eliminating soft tissue swelling/inflammation/restriction, improving soft tissue extensibility and allowing for proper ROM for normal function with self care, reaching, carrying, lifting, house/yardwork, driving/computer work    ADL Training:  [] (55502) Provided self-care/home management training related to activities of daily living and compensatory training, and/or use of adaptive equipment      Charges:  Timed Code Treatment Minutes: 23   Total Treatment Minutes: 60   Worker's Comp: Time In/Time Out     [x] EVAL (LOW) 35434 (typically 20 minutes face-to-face)    [] EVAL (MOD) 18331 (typically 30 minutes face-to-face)  [] EVAL (HIGH) 11332 (typically 45 minutes face-to-face)  [] OT Re-eval (93346)       [x] Teresita [phone]) x    1  [] KOSEO(52191)  [] NMR (90305) x      [] Estim (attended) (23059)   [x] Manual ([phone]) x    1  [] US (24116)  [] TA (81674) x      [] Paraffin (48957)  [] ADL  ([phone]) x     [] Splint/L code:    [] Estim (unattended) [phone])  [] Fluidotherapy (72333)  [] DN 1-2 (78241)   [] DN 3+ ([phone])  [] Orthotic Mgmt, Subsequent Enc (04453)  [] Orthotic Mgmt & Training (89326)  [] Other:    ASSESSMENT:       GOALS:  Patient stated goal: to resume crafting, driving without pain    []? Progressing: []? Met: []? Not Met: []? Adjusted     Therapist goals for Patient:   Short Term Goals: To be achieved in: 2 weeks  1. Independent in HEP and progression per patient tolerance, in order to prevent re-injury. []? Progressing: []? Met: []? Not Met: []? Adjusted   2. Patient will have a decrease in pain to facilitate improvement in movement, function, and ADLs as indicated by Functional Deficits.     []? Progressing: []? Met: []? Not Met: []? Adjusted     Long Term Goals to be achieved in 6 weeks (through 6/1/2020), including patient directed goals to address patient identified performance deficits:  1) Pt to be independent in graded HEP progression with a good level of effort and compliance. []? Progressing: []? Met: []? Not Met: []? Adjusted   2) Pt to report a score of </= 30 % on the Quick DASH disability questionnaire for increased performance with carrying, moving, and handling objects. []? Progressing: []? Met: []? Not Met: []? Adjusted   3) Pt will demonstrate increased ROM to wrist and thumb equal to each other for improved independence with ability to lean onto hands with household tasks. []? Progressing: []? Met: []? Not Met: []? Adjusted   4) Pt will demonstrate increased strength to  40# bilat, pinches >10# for improved independence with household tasks, resuming crafting, job duties. []? Progressing: []? Met: []? Not Met: []? Adjusted   5) Pt will have a decrease in pain to 2/10 to facilitate ability to do job duties at level pre surgery. []? Progressing: []? Met: []? Not Met: []? Adjusted      Overall Progression Towards Functional Goals/Treatment Progress Update:  [] Patient is progressing as expected towards functional goals listed. [] Progression is slowed due to complexities/impairments listed. [] Progression has been slowed due to co-morbidities.   [x] Plan just implemented, too soon to assess goals progression <30 days  [] Goals require adjustment due to lack of progress  [] Patient is not progressing as expected and requires additional follow up with physician  [] All goals are met  [] Other:     Prognosis for POC: [x] Good [] Fair  [] Poor    Patient requires continued skilled intervention: [x] Yes  [] No    Treatment/Activity Tolerance:  [x] Patient able to complete treatment  [] Patient limited by fatigue  [] Patient limited by pain    [] Patient limited by other medical

## 2020-04-20 NOTE — LETTER
[facility]  [address] [facility] [address]  Phone: [phone]  Fax: [address] Je Renee MD        April 20, 2020     Patient: Christiano Waggoner   YOB: 1970   Date of Visit: 4/20/2020       To Whom It May Concern: It is my medical opinion that Alean Mcardle should remain out of work until 04/29/2020. If you have any questions or concerns, please don't hesitate to call. Sincerely,            Shelly Donaldson.  Joella Matt, MD Claude Letters, MD

## 2020-04-20 NOTE — PLAN OF CARE
[facility], [address] [place] [address]  Phone: [phone]  Fax [phone]    Occupational Therapy/Hand Therapy Certification  Dear Referring Practitioner: Ming Milton MD    We had the pleasure of evaluating the following patient for occupational therapy services at [address] [place] [address]. A summary of our findings can be found in the initial assessment below. This includes our plan of care. If you have any questions or concerns regarding these findings, please do not hesitate to contact me at the office phone number checked above. Thank you for the referral.     Physician Signature:_______________________________Date:__________________  By signing above (or electronic signature), therapists plan is approved by physician    Evaluation Date: 2020   Patient: Pepe Barnett   : 1970   MRN: [id number]    Referring Physician: Referring Practitioner: Ming Milton MD   Insurance information: OT Insurance Information: [facility]   Medical Diagnosis Information:  Diagnosis: DX: G56.03 (ICD-10-CM) - Carpal tunnel syndrome, bilateral    TREATMENT DX: M79.641, X14.677 (ICD-10-CM) - Bilateral hand pain   DOS: 3/10/2020 Bilat carpal tunnel releases (6 wks post op on 2020)   ONSET: 2019  Date of Patient follow up with Physician: PRN    RESTRICTIONS/PRECAUTIONS: NONE  Latex Allergy:  [x]No      []Yes  Pacemaker:  [x] No       [] Yes   [x] Patient reported history, allergies, and medications reviewed - see intake form.      Preferred Language for Healthcare:   [x]English       []other:     Comorbidities Affecting Functional Performance:     []Anxiety (F41.9)/Depression (F32.9)   []Diabetes Type 1(E10.65) or 2 (E11.65)   []Rheumatoid Arthritis (M05.9)  []Fibromyalgia (M79.7)  []Neuropathy(G60.9)  []Osteoarthritis(M19.91)  [x]None   []Other:    Functional Scale: 50% (Quick DASH)   Date assessed: Feeding   [] Grooming    [] Work/Education   [] Functional Mobility   [] Sleeping/Rest   [] Toileting/Hygiene   [x] Recreational Activities   [] Driving    [] Community/Social Participation   [] Other:     Rehab Potential:   [] Excellent [x] Good [] Fair  [] Poor     Barriers affecting rehab potential:  []Age    []Lack of Motivation   []Co-Morbidities  []Cognitive Function  []Environmental/home/work barriers  []Other: Tolerance of evaluation/treatment:    [] Excellent [x] Good [] Fair  [] Poor    PLAN OF CARE:  Interventions:   [x] Therapeutic Exercise [x] Therapeutic Activity    [x] Activities of Daily Living [x] Neuromuscular Re-education      [x] Patient Education  [x] Manual Therapy      [x] Modalities as needed, and not otherwise contraindicated, including: ultrasound,paraffin,moist heat/cold pack, electrical stimulation, contrast bath, iontophoresis  [] Splinting    Frequency/Duration:  1 days per week for 6 weeks    GOALS:  Patient stated goal: to resume crafting, driving without pain    [] Progressing: [] Met: [] Not Met: [] Adjusted    Therapist goals for Patient:   Short Term Goals: To be achieved in: 2 weeks  1. Independent in HEP and progression per patient tolerance, in order to prevent re-injury. [] Progressing: [] Met: [] Not Met: [] Adjusted   2. Patient will have a decrease in pain to facilitate improvement in movement, function, and ADLs as indicated by Functional Deficits. [] Progressing: [] Met: [] Not Met: [] Adjusted    Long Term Goals to be achieved in 6 weeks (through 6/1/2020), including patient directed goals to address patient identified performance deficits:  1) Pt to be independent in graded HEP progression with a good level of effort and compliance. [] Progressing: [] Met: [] Not Met: [] Adjusted   2) Pt to report a score of </= 30 % on the Quick DASH disability questionnaire for increased performance with carrying, moving, and handling objects.   [] Progressing: [] Met: [] Not Met: [] Adjusted   3) Pt will demonstrate increased ROM to wrist and thumb equal to each other for improved independence with ability to lean onto hands with household tasks. [] Progressing: [] Met: [] Not Met: [] Adjusted   4) Pt will demonstrate increased strength to  40# bilat, pinches >10# for improved independence with household tasks, resuming crafting, job duties. [] Progressing: [] Met: [] Not Met: [] Adjusted   5) Pt will have a decrease in pain to 2/10 to facilitate ability to do job duties at level pre surgery. [] Progressing: [] Met: [] Not Met: [] Adjusted        OCCUPATIONAL THERAPY EVALUATION COMPLEXITY JUSTIFICATION:    [x] An occupational profile and medical/therapy history, which includes:   [x] a brief history including medical and/or therapy records relating to the     presenting problem   [] an expanded review of medical and/or therapy records and additional review     of physical, cognitive or psychosocial history related to current functional    performance   [] an extensive additional review of review of medical and/or therapy records   and physical, cognitive, or psychosocial history related to current    functional performance    [x] An assessment that identifies performance deficits (relating to physical, cognitive, or psychosocial skills) that result in activity limitations and/or participation restrictions:   [x] 1-3 performance deficits   [] 3-5 performance deficits   [] 5 or more performance deficits    [x] Clinical decision making of:   [x] low complexity, including analysis of occupational profile, data analysis from problem focused assessment, and consideration of a limited number of treatment options. No comorbidities affect occupational performance. No task modifications or assistance needed to complete evaluation.    [] moderate complexity, including analysis of occupational profile, data analysis from detailed assessment and consideration of several treatment

## 2020-04-27 ENCOUNTER — TELEPHONE (OUTPATIENT)
Dept: ORTHOPEDIC SURGERY | Age: 50
End: 2020-04-27

## (undated) DEVICE — ZIMMER® STERILE DISPOSABLE TOURNIQUET CUFF WITH PLC, DUAL PORT, SINGLE BLADDER, 12 IN. (30 CM)

## (undated) DEVICE — CONVERTORS STOCKINETTE: Brand: CONVERTORS

## (undated) DEVICE — PADDING CAST W4INXL4YD NONSTERILE COT RAYON MICROPLEATED

## (undated) DEVICE — MEDC BAG ICE SM REFILLABLE W/TIES

## (undated) DEVICE — SET GRAV VENT NVENT CK VLV 3 NDL FREE PRT 10 GTT

## (undated) DEVICE — SUTURE ETHLN SZ 4-0 L18IN NONABSORBABLE BLK L19MM PS-2 3/8 1667H

## (undated) DEVICE — NEEDLE HYPO 25GA L1.5IN BLU POLYPR HUB S STL REG BVL STR

## (undated) DEVICE — STANDARD HYPODERMIC NEEDLE,POLYPROPYLENE HUB: Brand: MONOJECT

## (undated) DEVICE — GLOVE SURG SZ 75 L12IN FNGR THK94MIL STD WHT LTX FREE

## (undated) DEVICE — GOWN,SIRUS,NON REINFRCD,LARGE,SET IN SL: Brand: MEDLINE

## (undated) DEVICE — CORD ES L12FT BPLR FRCP

## (undated) DEVICE — ZIMMER® STERILE DISPOSABLE TOURNIQUET CUFF WITH PLC, DUAL PORT, SINGLE BLADDER, 18 IN. (46 CM)

## (undated) DEVICE — ABDOMINAL PAD: Brand: DERMACEA

## (undated) DEVICE — SET ADMIN PRIMING 7ML L30IN 7.35LB 20 GTT 2ND RLER CLMP

## (undated) DEVICE — T-DRAPE,EXTREMITY,STERILE: Brand: MEDLINE

## (undated) DEVICE — DRAPE,U/ SHT,SPLIT,PLAS,STERIL: Brand: MEDLINE

## (undated) DEVICE — GLOVE SURG SZ 65 L12IN FNGR THK94MIL STD WHT LTX FREE

## (undated) DEVICE — SOLUTION IV IRRIG 500ML 0.9% SODIUM CHL 2F7123

## (undated) DEVICE — CHLORAPREP 26ML ORANGE

## (undated) DEVICE — MATERIAL PD W2XL4YD ST COT CAST SPLNT NONADHESIVE SPEC

## (undated) DEVICE — SOLUTION IV 1000ML LAC RINGERS PH 6.5 INJ USP VIAFLX PLAS

## (undated) DEVICE — GLOVE,SURG,SENSICARE,ALOE,LF,PF,7: Brand: MEDLINE

## (undated) DEVICE — CATHETER IV 20GA L1.25IN PNK FEP SFTY STR HUB RADPQ DISP

## (undated) DEVICE — Device

## (undated) DEVICE — 3M™ TEGADERM™ TRANSPARENT FILM DRESSING FRAME STYLE, 1624W, 2-3/8 IN X 2-3/4 IN (6 CM X 7 CM), 100/CT 4CT/CASE: Brand: 3M™ TEGADERM™

## (undated) DEVICE — BANDAGE COMPR W3INXL5YD HI E BGE W/ CLP SURE-WRAP